# Patient Record
Sex: MALE | Race: WHITE | Employment: FULL TIME | URBAN - NONMETROPOLITAN AREA
[De-identification: names, ages, dates, MRNs, and addresses within clinical notes are randomized per-mention and may not be internally consistent; named-entity substitution may affect disease eponyms.]

---

## 2020-08-02 ENCOUNTER — HOSPITAL ENCOUNTER (INPATIENT)
Age: 30
LOS: 2 days | Discharge: HOME | DRG: 885 | End: 2020-08-04
Attending: PSYCHIATRY & NEUROLOGY | Admitting: PSYCHIATRY & NEUROLOGY

## 2020-08-02 PROBLEM — F29 PSYCHOSIS (HCC): Status: ACTIVE | Noted: 2020-08-02

## 2020-08-02 LAB
ACETAMINOPHEN LEVEL: < 5 UG/ML (ref 0–20)
ALBUMIN SERPL-MCNC: 4.7 G/DL (ref 3.5–5.1)
ALP BLD-CCNC: 89 U/L (ref 38–126)
ALT SERPL-CCNC: 23 U/L (ref 11–66)
AMPHETAMINE+METHAMPHETAMINE URINE SCREEN: NEGATIVE
ANION GAP SERPL CALCULATED.3IONS-SCNC: 14 MEQ/L (ref 8–16)
AST SERPL-CCNC: 26 U/L (ref 5–40)
BARBITURATE QUANTITATIVE URINE: NEGATIVE
BASOPHILS # BLD: 0.9 %
BASOPHILS ABSOLUTE: 0.1 THOU/MM3 (ref 0–0.1)
BENZODIAZEPINE QUANTITATIVE URINE: NEGATIVE
BILIRUB SERPL-MCNC: 0.7 MG/DL (ref 0.3–1.2)
BILIRUBIN DIRECT: < 0.2 MG/DL (ref 0–0.3)
BILIRUBIN URINE: NEGATIVE
BLOOD, URINE: NEGATIVE
BUN BLDV-MCNC: 13 MG/DL (ref 7–22)
CALCIUM SERPL-MCNC: 9.4 MG/DL (ref 8.5–10.5)
CANNABINOID QUANTITATIVE URINE: NEGATIVE
CHARACTER, URINE: CLEAR
CHLORIDE BLD-SCNC: 99 MEQ/L (ref 98–111)
CO2: 25 MEQ/L (ref 23–33)
COCAINE METABOLITE QUANTITATIVE URINE: NEGATIVE
COLOR: YELLOW
CREAT SERPL-MCNC: 0.8 MG/DL (ref 0.4–1.2)
EOSINOPHIL # BLD: 3.2 %
EOSINOPHILS ABSOLUTE: 0.3 THOU/MM3 (ref 0–0.4)
ERYTHROCYTE [DISTWIDTH] IN BLOOD BY AUTOMATED COUNT: 12.9 % (ref 11.5–14.5)
ERYTHROCYTE [DISTWIDTH] IN BLOOD BY AUTOMATED COUNT: 41 FL (ref 35–45)
ETHYL ALCOHOL, SERUM: 0.05 %
GFR SERPL CREATININE-BSD FRML MDRD: > 90 ML/MIN/1.73M2
GLUCOSE BLD-MCNC: 97 MG/DL (ref 70–108)
GLUCOSE URINE: NEGATIVE MG/DL
HCT VFR BLD CALC: 45.2 % (ref 42–52)
HEMOGLOBIN: 15.9 GM/DL (ref 14–18)
IMMATURE GRANS (ABS): 0.04 THOU/MM3 (ref 0–0.07)
IMMATURE GRANULOCYTES: 0.4 %
KETONES, URINE: NEGATIVE
LEUKOCYTE ESTERASE, URINE: NEGATIVE
LYMPHOCYTES # BLD: 32.9 %
LYMPHOCYTES ABSOLUTE: 3.2 THOU/MM3 (ref 1–4.8)
MCH RBC QN AUTO: 30.9 PG (ref 26–33)
MCHC RBC AUTO-ENTMCNC: 35.2 GM/DL (ref 32.2–35.5)
MCV RBC AUTO: 87.9 FL (ref 80–94)
MONOCYTES # BLD: 5.4 %
MONOCYTES ABSOLUTE: 0.5 THOU/MM3 (ref 0.4–1.3)
NITRITE, URINE: NEGATIVE
NUCLEATED RED BLOOD CELLS: 0 /100 WBC
OPIATES, URINE: NEGATIVE
OSMOLALITY CALCULATION: 275.7 MOSMOL/KG (ref 275–300)
OXYCODONE: NEGATIVE
PH UA: 6.5 (ref 5–9)
PHENCYCLIDINE QUANTITATIVE URINE: NEGATIVE
PLATELET # BLD: 232 THOU/MM3 (ref 130–400)
PMV BLD AUTO: 8.7 FL (ref 9.4–12.4)
POTASSIUM SERPL-SCNC: 3.5 MEQ/L (ref 3.5–5.2)
PROTEIN UA: NEGATIVE
RBC # BLD: 5.14 MILL/MM3 (ref 4.7–6.1)
SALICYLATE, SERUM: < 0.3 MG/DL (ref 2–10)
SEG NEUTROPHILS: 57.2 %
SEGMENTED NEUTROPHILS ABSOLUTE COUNT: 5.6 THOU/MM3 (ref 1.8–7.7)
SODIUM BLD-SCNC: 138 MEQ/L (ref 135–145)
SPECIFIC GRAVITY, URINE: < 1.005 (ref 1–1.03)
TOTAL PROTEIN: 7.8 G/DL (ref 6.1–8)
TSH SERPL DL<=0.05 MIU/L-ACNC: 1.84 UIU/ML (ref 0.4–4.2)
UROBILINOGEN, URINE: 0.2 EU/DL (ref 0–1)
WBC # BLD: 9.8 THOU/MM3 (ref 4.8–10.8)

## 2020-08-02 PROCEDURE — 99223 1ST HOSP IP/OBS HIGH 75: CPT | Performed by: PSYCHIATRY & NEUROLOGY

## 2020-08-02 PROCEDURE — 6370000000 HC RX 637 (ALT 250 FOR IP): Performed by: PSYCHIATRY & NEUROLOGY

## 2020-08-02 PROCEDURE — 80053 COMPREHEN METABOLIC PANEL: CPT

## 2020-08-02 PROCEDURE — G0480 DRUG TEST DEF 1-7 CLASSES: HCPCS

## 2020-08-02 PROCEDURE — 82248 BILIRUBIN DIRECT: CPT

## 2020-08-02 PROCEDURE — APPSS60 APP SPLIT SHARED TIME 46-60 MINUTES: Performed by: NURSE PRACTITIONER

## 2020-08-02 PROCEDURE — 80307 DRUG TEST PRSMV CHEM ANLYZR: CPT

## 2020-08-02 PROCEDURE — 1240000000 HC EMOTIONAL WELLNESS R&B

## 2020-08-02 PROCEDURE — 99283 EMERGENCY DEPT VISIT LOW MDM: CPT

## 2020-08-02 PROCEDURE — 81003 URINALYSIS AUTO W/O SCOPE: CPT

## 2020-08-02 PROCEDURE — 84443 ASSAY THYROID STIM HORMONE: CPT

## 2020-08-02 PROCEDURE — 36415 COLL VENOUS BLD VENIPUNCTURE: CPT

## 2020-08-02 PROCEDURE — 6370000000 HC RX 637 (ALT 250 FOR IP): Performed by: NURSE PRACTITIONER

## 2020-08-02 PROCEDURE — 85025 COMPLETE CBC W/AUTO DIFF WBC: CPT

## 2020-08-02 RX ORDER — TRAZODONE HYDROCHLORIDE 50 MG/1
50 TABLET ORAL NIGHTLY PRN
Status: DISCONTINUED | OUTPATIENT
Start: 2020-08-02 | End: 2020-08-04 | Stop reason: HOSPADM

## 2020-08-02 RX ORDER — ACETAMINOPHEN 325 MG/1
650 TABLET ORAL EVERY 4 HOURS PRN
Status: DISCONTINUED | OUTPATIENT
Start: 2020-08-02 | End: 2020-08-04 | Stop reason: HOSPADM

## 2020-08-02 RX ORDER — RISPERIDONE 0.25 MG/1
0.5 TABLET, FILM COATED ORAL 2 TIMES DAILY
Status: DISCONTINUED | OUTPATIENT
Start: 2020-08-02 | End: 2020-08-04 | Stop reason: HOSPADM

## 2020-08-02 RX ORDER — MAGNESIUM HYDROXIDE/ALUMINUM HYDROXICE/SIMETHICONE 120; 1200; 1200 MG/30ML; MG/30ML; MG/30ML
30 SUSPENSION ORAL EVERY 6 HOURS PRN
Status: DISCONTINUED | OUTPATIENT
Start: 2020-08-02 | End: 2020-08-04 | Stop reason: HOSPADM

## 2020-08-02 RX ORDER — NICOTINE 21 MG/24HR
1 PATCH, TRANSDERMAL 24 HOURS TRANSDERMAL DAILY
Status: DISCONTINUED | OUTPATIENT
Start: 2020-08-02 | End: 2020-08-03

## 2020-08-02 RX ORDER — IBUPROFEN 200 MG
400 TABLET ORAL EVERY 6 HOURS PRN
Status: DISCONTINUED | OUTPATIENT
Start: 2020-08-02 | End: 2020-08-04 | Stop reason: HOSPADM

## 2020-08-02 RX ORDER — HYDROXYZINE HYDROCHLORIDE 25 MG/1
50 TABLET, FILM COATED ORAL 3 TIMES DAILY PRN
Status: DISCONTINUED | OUTPATIENT
Start: 2020-08-02 | End: 2020-08-04 | Stop reason: HOSPADM

## 2020-08-02 RX ADMIN — HYDROXYZINE HYDROCHLORIDE 50 MG: 25 TABLET ORAL at 05:56

## 2020-08-02 RX ADMIN — RISPERIDONE 0.5 MG: 0.25 TABLET ORAL at 08:41

## 2020-08-02 RX ADMIN — TRAZODONE HYDROCHLORIDE 50 MG: 50 TABLET ORAL at 20:44

## 2020-08-02 RX ADMIN — HYDROXYZINE HYDROCHLORIDE 50 MG: 25 TABLET ORAL at 20:44

## 2020-08-02 RX ADMIN — RISPERIDONE 0.5 MG: 0.25 TABLET ORAL at 20:41

## 2020-08-02 RX ADMIN — HYDROXYZINE HYDROCHLORIDE 50 MG: 25 TABLET ORAL at 14:43

## 2020-08-02 ASSESSMENT — SLEEP AND FATIGUE QUESTIONNAIRES
RESTFUL SLEEP: NO
DO YOU HAVE DIFFICULTY SLEEPING: YES
DIFFICULTY STAYING ASLEEP: YES
AVERAGE NUMBER OF SLEEP HOURS: 5
DO YOU USE A SLEEP AID: COMMENT
DIFFICULTY FALLING ASLEEP: YES
DO YOU USE A SLEEP AID: NO
DIFFICULTY STAYING ASLEEP: YES
AVERAGE NUMBER OF SLEEP HOURS: 5
DIFFICULTY FALLING ASLEEP: YES
AVERAGE NUMBER OF SLEEP HOURS: 4
DIFFICULTY FALLING ASLEEP: YES
DIFFICULTY ARISING: NO
SLEEP PATTERN: DIFFICULTY FALLING ASLEEP;DISTURBED/INTERRUPTED SLEEP;RESTLESSNESS;NIGHTMARES/TERRORS
DO YOU HAVE DIFFICULTY SLEEPING: YES
DIFFICULTY STAYING ASLEEP: YES
DO YOU HAVE DIFFICULTY SLEEPING: YES
RESTFUL SLEEP: NO
SLEEP PATTERN: DIFFICULTY FALLING ASLEEP;RESTLESSNESS;DISTURBED/INTERRUPTED SLEEP;NIGHTMARES/TERRORS
RESTFUL SLEEP: NO
SLEEP PATTERN: DIFFICULTY FALLING ASLEEP;DISTURBED/INTERRUPTED SLEEP
DO YOU USE A SLEEP AID: NO
DIFFICULTY ARISING: NO
DIFFICULTY ARISING: NO

## 2020-08-02 ASSESSMENT — PATIENT HEALTH QUESTIONNAIRE - PHQ9
SUM OF ALL RESPONSES TO PHQ QUESTIONS 1-9: 17
SUM OF ALL RESPONSES TO PHQ QUESTIONS 1-9: 17

## 2020-08-02 ASSESSMENT — ENCOUNTER SYMPTOMS
BACK PAIN: 0
COUGH: 0
RHINORRHEA: 0
CHEST TIGHTNESS: 0

## 2020-08-02 ASSESSMENT — PAIN SCALES - GENERAL
PAINLEVEL_OUTOF10: 0

## 2020-08-02 ASSESSMENT — LIFESTYLE VARIABLES: HISTORY_ALCOHOL_USE: YES

## 2020-08-02 NOTE — PLAN OF CARE
Problem: Altered Mood, Psychotic Behavior:  Goal: Able to demonstrate trust by eating, participating in treatment and following staff's direction  Description: Able to demonstrate trust by eating, participating in treatment and following staff's direction  Outcome: Ongoing  Note: Pt has slept most of the shift, has not attended group  Goal: Able to verbalize decrease in frequency and intensity of hallucinations  Description: Able to verbalize decrease in frequency and intensity of hallucinations  Outcome: Ongoing  Note: Pt denies hallucinations at time of assessment  Goal: Able to verbalize reality based thinking  Description: Able to verbalize reality based thinking  Outcome: Met This Shift  Note: Pt able to verbalize reality based thinking  Goal: Ability to interact with others will improve  Description: Ability to interact with others will improve  Outcome: Ongoing  Note: Pt has slept most of the shift, has not attended group, isolative     Problem: Substance Abuse:  Goal: Absence of drug withdrawal signs and symptoms  Description: Absence of drug withdrawal signs and symptoms  Outcome: Met This Shift  Note: Pt denies all symptoms of withdrawal     Problem: Anxiety:  Goal: Level of anxiety will decrease  Description: Level of anxiety will decrease  Outcome: Ongoing  Note: Patient reports some anxiety. Pt sleeping     Problem: Activity:  Goal: Sleeping patterns will improve  Description: Sleeping patterns will improve  Outcome: Not Met This Shift  Note: Patient slept 0 hours last night, reports they slept poorly  Encourage patient not to take naps during the day, relax several hours before bed and to take prescribed sleep meds as ordered. Patient  verbalized understanding.        Problem: Discharge Planning:  Goal: Discharged to appropriate level of care  Description: Discharged to appropriate level of care  Outcome: Not Met This Shift  Note: Discharge planners working with patient to achieve optimal discharge plan, specific to the needs of this patient. Problem: KNOWLEDGE DEFICIT,EDUCATION,DISCHARGE PLAN  Goal: Knowledge - personal safety  Outcome: Ongoing  Note: Maintained in safe and secure environment. Patient did not fill out safety plan this shift. Care plan reviewed with patient.   Patient does not verbalize understanding of the plan of care and does not contribute to goal setting

## 2020-08-02 NOTE — BH NOTE
INPATIENT RECREATIONAL THERAPY  ADULT BEHAVIORAL SERVICES  EVALUATION    REFERRING PHYSICIAN:  Dr. Chika Alas   DIAGNOSIS:   Unspecified Psychosis, Possible Bipolar   PRECAUTIONS:  Standard Precautions   HISTORY OF PRESENT ILLNESS/INJURY: Pt admitted to the unit on an [facility] after reporting that he is hearing voices outside his window. Pt documented as returning to [place], [place] from [place] believing that there is a sex trafficking ring there. Pt reports that he was afraid and went to the police for a witness protection program. Pt documented as saying he lived in a small room behind a kitchen in a restaurant. Pt denies self harm but reports he was buying medications off of the street to take for anxiety. Pt is documented that he is anxious he is going to get killed because he knows about the sex trafficking ring. Pt documented as reporting he is getting threatening phone calls since leaving the hotel in [place]. PMH:  Please see medical chart for prior medical history, allergies, and medication    HISTORY OF PSYCHIATRIC TREATMENT: Pt denies any previous inpatient psychiatric hospitalizations as well as past suicidal gestures. Pt reports he did see psychiatry in 2005. 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 Pt reports that he drinks alcohol daily (\"a couple white claws daily\"). Pt denies tobacco use. Pt UDS is negative. Pt documented buying Klonopin and Xanax on the street.      LEISURE INTERESTS:  TV/movies, listening to music, outdoor activities, phone, current events, activities with friends, work   ACTIVITY PREFERENCE: No preference   ACTIVITY TYPES:  Indoor, Outdoor, Active, Passive  COGNITION: A&Ox4    COPING: Poor   ATTENTION: Fair   RELAXATION: Poor   SELF-ESTEEM: Poor   MOTIVATION:  Fair SOCIAL SKILLS:  Fair   FRUSTRATION TOLERANCE:  No documented hx of violence   ATTENTION SEEKING: No attention seeking behaviors noted at this time   COOPERATION: Pt cooperative   AFFECT: Flat   APPEARANCE: Pt displays fair grooming and hygiene and is currently dressed in hospital scrub attire. HEARING:  No impairments noted at this time   VISION:  Corrective Lenses: Glasses   VERBAL COMMUNICATION:  No impairments noted at this time   WRITTEN COMMUNICATION:   No impairments noted at this time     COORDINATION:  No impairments noted at this time   MOBILITY:   Pt ambulates independently   GOALS: Pt to increase socialization and knowledge of coping skills through participation in group therapy sessions following verbal encouragement from staff.

## 2020-08-02 NOTE — ED PROVIDER NOTES
[address]  Pt Name: Heide Shone  MRN: [id number]  [place] 1990  Date of evaluation: 8/2/2020  Provider: ANDREW Hawkins CNP    CHIEF COMPLAINT       Chief Complaint   Patient presents with   [facility] Psychiatric Evaluation       Nurses Notes reviewed and I agree except as noted in the HPI. HISTORY OF PRESENT ILLNESS    Heide Shone is a 34 y.o. male whopresents to the emergency department via [address] [facility] for psych evaluation. Pt states he was in [place] when he thinks he found out about a \"child sex trafficking ring. \" He was not willing to disclose more information about this. He then started hearing voices that told him he was going to be killed if he said anything about it. Pt reports fearing for his life and went to the [address] police station, and they called EMS. He denies suicidal or homicidal ideation. He also denies hearing voices that others can't hear or seeing things others can't hear. No complaints at this time. HPI was provided by the patient. Triage notes and Nursing notes were reviewed by myself. Any discrepancies are addressed above. REVIEW OF SYSTEMS     Review of Systems   Constitutional: Negative for chills, fatigue and fever. HENT: Negative for congestion, ear discharge, ear pain, postnasal drip and rhinorrhea. Respiratory: Negative for cough and chest tightness. Genitourinary: Negative for difficulty urinating, dysuria, enuresis, flank pain and hematuria. Musculoskeletal: Negative for back pain and joint swelling. Neurological: Negative for dizziness, light-headedness, numbness and headaches. Psychiatric/Behavioral: Negative for agitation, behavioral problems and confusion. All pertinent systems were reviewed and are negative unless indicated in the HPI. PAST MEDICAL HISTORY    has no past medical history on file. SURGICAL HISTORY      has no past surgical history on file.     CURRENT MEDICATIONS       There are no preliminarily interpreted by the emergencyphysician unless otherwise stated below. No orders to display         LABS:   Labs Reviewed   CBC WITH AUTO DIFFERENTIAL - Abnormal; Notable for the following components:       Result Value    MPV 8.7 (*)     All other components within normal limits   SALICYLATE LEVEL - Abnormal; Notable for the following components:    Salicylate, Serum < 0.3 (*)     All other components within normal limits   ACETAMINOPHEN LEVEL   BASIC METABOLIC PANEL   ETHANOL   HEPATIC FUNCTION PANEL   TSH WITHOUT REFLEX   URINE DRUG SCREEN   URINE RT REFLEX TO CULTURE   ANION GAP   OSMOLALITY   GLOMERULAR FILTRATION RATE, ESTIMATED         EMERGENCYDEPARTMENT COURSE AND MEDICAL DECISION MAKING:   Vitals:    Vitals:    08/02/20 0210 08/02/20 0504 08/02/20 0752 08/02/20 2004   BP: (!) 144/89 (!) 137/90 (!) 146/65 (!) 107/49   Pulse: 84 83 88 52   Resp: 17 18 16 16   Temp: 97.6 °F (36.4 °C) 97.1 °F (36.2 °C) 96.9 °F (36.1 °C) 97.2 °F (36.2 °C)   TempSrc: Oral Tympanic Tympanic Oral   SpO2: 97% 99% 99% 100%   Weight:  180 lb (81.6 kg)     Height:  6' 2\" (1.88 m)           Pertinent Labs & Imaging studies reviewed. (See chart for details)           Controlled Substances Monitoring:     No flowsheet data found. The patient was seen and evaluated within the ED today for the evaluation of suicidal ideation. Physical exam revealed no significant abnormalities or concerns. I completed a medical evaluation of the patient and ordered appropriate labs which were unremarkable. [facility] and social work completed a full psychiatric evaluation of the patient and determined that he met inpatient criteria. I medically cleared the patient. [facility] and social work's noted should be consulted for the psychiatric evaluation and reason for admission to the inpatient psychiatric unit.      Strict return precautions and follow up instructions were discussed with the patient with which the patient agrees     Physical assessment findings, diagnostic testing(s) if applicable, and vital signs reviewed with patient/patient representative. Questions answered. Medications asdirected, including OTC medications for supportive care. Education provided on medications. Differential diagnosis(s) discussed with patient/patient representative. Home care/self care instructions reviewed withpatient/patient representative. Patient is to follow-up with family care provider in 2-3 days if no improvement. Patient is to go to the emergency department if symptoms worsen. Patient/patient representative isaware of care plan, questions answered, verbalizes understanding and is in agreement. ED Medications administered this visit:   Medications   acetaminophen (TYLENOL) tablet 650 mg (has no administration in time range)   ibuprofen (ADVIL;MOTRIN) tablet 400 mg (has no administration in time range)   hydrOXYzine (ATARAX) tablet 50 mg (50 mg Oral Given 8/2/20 2044)   traZODone (DESYREL) tablet 50 mg (50 mg Oral Given 8/2/20 2044)   magnesium hydroxide (MILK OF MAGNESIA) 400 MG/5ML suspension 30 mL (has no administration in time range)   aluminum & magnesium hydroxide-simethicone (MAALOX) 200-200-20 MG/5ML suspension 30 mL (has no administration in time range)   nicotine (NICODERM CQ) 14 MG/24HR 1 patch (1 patch Transdermal Not Given 8/2/20 0845)   risperiDONE (RISPERDAL) tablet 0.5 mg (0.5 mg Oral Given 8/2/20 2041)           I have given the patient strict written and verbal instructions about care at home,follow-up, and signs and symptoms of worsening of condition and they did verbalize understanding. Patient was seen independently by myself. The Patient's final impression and disposition and plan was determined by myself. CRITICAL CARE:   None    CONSULTS:  None    PROCEDURES:  None    FINAL IMPRESSION      1.  Delusional disorder [facility])          DISPOSITION/PLAN   DISPOSITION Admitted 08/02/2020 04:20:22 AM        PATIENT REFERRED TO:  No follow-up provider specified. DISCHARGE MEDICATIONS:  There are no discharge medications for this patient.       (Please note that portions of this note were completed with a voice recognition program.  Efforts were made to edit thedictations but occasionally words are mis-transcribed.)    ANDREW Ordaz - ANDREW Ospina CNP  08/03/20 [id number]

## 2020-08-02 NOTE — BH NOTE
Behavioral Health   Admission Note     Admission Type:   Admission Type: Involuntary    Reason for admission:  Reason for Admission: Hearing voices outside the window    PATIENT STRENGTHS:  Strengths: Communication, Positive Support, No significant Physical Illness    Patient Strengths and Limitations:  Limitations: Difficulty problem solving/relies on others to help solve problems    Addictive Behavior:   Addictive Behavior  Do you have a history of Chemical Use?: No  Do you have a history of Alcohol Use?: Yes  Do you have a history of Street Drug Abuse?: Yes  Histroy of Prescripton Drug Abuse?: No    Medical Problems:   History reviewed. No pertinent past medical history. Status EXAM:  Status and Exam  Normal: No  Facial Expression: Avoids Gaze  Affect: Constricted  Level of Consciousness: Alert  Mood:Normal: No  Mood: Anxious, Suspicious  Motor Activity:Normal: Yes  Interview Behavior: Evasive  Preception: [place] to Person, Theresa Sauger to Time, [place] to Place, [place] to Situation  Attention:Normal: No  Attention: Distractible  Thought Processes: Circumstantial  Thought Content:Normal: No  Thought Content: Paranoia, Delusions  Hallucinations: Auditory (Comment)  Delusions: Yes  Delusions: Other(See Comment)(paranoia)  Memory:Normal: Yes  Insight and Judgment: No  Insight and Judgment: Poor Judgment, Poor Insight  Present Suicidal Ideation: No  Present Homicidal Ideation: No    Pt admitted with followings belongings:  Dentures: None  Vision - Corrective Lenses: Glasses  Hearing Aid: None  Jewelry: None  Body Piercings Removed: N/A  Clothing: Shirt, Pants, Footwear, Undergarments (Comment)  Were All Patient Medications Collected?: Not Applicable  Other Valuables: Money (Comment), Wallet, Other (Comment), Cell phone, Keys($50, ID)     Admission order obtained Yes. Valuables placed in safe in security envelope, number:  [id number]. Patient oriented to surroundings and program expectations and copy of patient rights given. Received admission packet:  yes. Consents reviewed, signed yes. Patient verbalize understanding:  yes. Patient education on precautions: yes           Explained patients right to have family, representative or physician notified of their admission. Patient has Declined for physician to be notified. Patient has Declined for family/representative to be notified. Provided pt with [employer] handout entitled \"Quitting Smoking. \"  Reviewed handout with pt addressing dangers of smoking, developing coping skills, and providing basic information about quitting. Pt response to counseling:  States not a smoker. Admitted from ED. [facility]. States that he lived in [place] from March to June of this year in the back room of a hotel. States that he believes that children were being sex trafficked there because he heard children's voices. States that he fled to [place] to get away from that . Reported in ED that he has had threatening phone calls and that he has heard voices outside of his window at home telling him that he was going to get killed. Also stated in ED that he believes that his phone is bugged. Upon arrival to the unit, patient states that he went to the police for protection and that they told him he was crazy, that didn't happen, and brought him here. States that he occasionally puffs marijuana when they pass it around at work. States that he does not smoke cigarettes,he does not like them. Reports that he drinks alcohol daily. States that he drinks 11-12 beers per day and that he has done so since he was about 13years old. Denies suicidal thoughts or plans. Denies homicidal thoughts or plans. Denies hallucinations. Oriented to room and unit. Consents signed. [address] admission status.            Jaelyn Newton, RADHA

## 2020-08-02 NOTE — PROGRESS NOTES
Provisional Diagnosis:   psychosis     Risk, Psychosocial and Contextual Factors:  Hallucinations, feels unsafe/in danger    Current MH Treatment: denies      Present Suicidal Behavior:      Verbal: denies         Attempt: denies    Access to Weapons:  denies    Current Suicide Risk: Low, Moderate or High:    low    Past Suicidal Behavior:       Verbal: denies    Attempt: denies    Self-Injurious/Self-Mutilation: denies    Traumatic Event Within Past 2 Weeks:   Pt 'fled' from [place] to [place] early June d/t feeling unsafe. States he witnessed a \"child sex trafficking ring\" in a hotel he was staying at. Pt states he has been receiving threatening phone calls and hearing voices outside his window that were threatening him. Current Abuse: denies    Legal: denies    Violence: denies    Protective Factors:  positive support, stable housing,     Housing:    Lives alone, [place]    CPAP/Oxygen/Ambulation Difficulties: none     Basic Vital Signs Normal?: Check with Patients Nurse prior to [address]?: Check with Patients Nurse prior to Calling Psychiatry    Clinical Summary:      Patient is a 34year old male who presents to the ED voluntarily via EMS, pt later [facility] by medical provider. Pt reports tonight, he heard voices outside his window telling him, \"you should not have said anything, now you're going to die. \" Pt reports in March he moved to [place] with a friend. There he lived in a [place] of a kitchen\" in a hotel. He is reporting that he believes there was a \"child sex trafficking ring. \" He states that he heard little kid voices and saw 'grotesque' things. He states that while he was there he did not tell anyone about what was going on. Then in June he 'fled' back to [place] because he felt unsafe. Since he has been in [place] he has started to 'put the pieces together' as to what was happening.  He stated people in [place] were asking him when the last time he was part of a 'pizza party.' Since in [place] he has been receiving threatening phone calls and hearing voices. He expressed that he believes his phone is bugged because 'they found where I lived tonight. And they are going to kill me.' He went to the police station in [facility] to tell them what was happening and asking for witness protection because he is in danger. Clinician discussed with pt the added social media coverage on child sex trafficking. Pt states, \"yea I see it all the time and I've read up on [employer] and [employer]. I know that's what happened. \"     Suicidal and Homicidal thoughts and/or plans denied. Reports occasional alcohol use, smoked marijuana last week. Pt is alert/oriented x4. Pt is cooperative. Pt avoids eye contact. He is fixated on discussing the need for clinician to \"investigate his case, rather than prove his is not crazy. \" Pt shows signs of paranoia and obsession within his story. Level of Care Disposition:      248: Consulted with medical provider. Patient is medically cleared. 319: attempted consult with Dr. Noa Tucker. No answer, left a voicemail. 349: Consulted with Dr Noa Tucker. Pt to be admitted to .   432: Gave report to [facility]. Bed assignment.  67B

## 2020-08-02 NOTE — H&P
disorganization noted  Suicidal Ideation: Denies suicidal ideations  Homicidal ideation: Denies homicidal ideations  Delusions: Subtle paranoia  Perceptual Disturbance: Denies AH/VH  Cognition: Oriented to person, place, time and situation   Concentration fair   Memory intact   Insight: Poor  Judgment: Poor    Vitals:    08/02/20 0752   BP: (!) 146/65   Pulse: 88   Resp: 16   Temp: 96.9 °F (36.1 °C)   SpO2: 99%     Review of Systems   Constitutional: Negative for chills and weight loss. HENT: Negative for ear pain and nosebleeds. Eyes: Negative for blurred vision and photophobia. Respiratory: Negative for cough, shortness of breath and wheezing. Cardiovascular: Negative for chest pain and palpitations. Gastrointestinal: Negative for abdominal pain, diarrhea and vomiting. Genitourinary: Negative for dysuria and urgency. Musculoskeletal: Negative for falls and joint pain. Skin: Negative for itching and rash. Neurological: Negative for tremors, seizures and weakness. Endo/Heme/Allergies: Does not bruise/bleed easily. Physical Exam:      Constitutional:  Appears well-developed and well-nourished, no acute distress  HENT:   Head: Normocephalic and atraumatic. Eyes: Conjunctivae are normal. Right eye exhibits no discharge. Left eye exhibits no discharge. No scleral icterus. Neck: Normal range of motion. Neck supple. Pulmonary/Chest:  No respiratory distress or accessory muscle use, no wheezing. Abdominal: Soft. Exhibits no distension. Musculoskeletal: Normal range of motion. Exhibits no edema. Neurological: cranial nerves II-XII grossly in tact, normal gait and station  Skin: Skin is warm and dry. Patient is not diaphoretic. No erythema. Impression:  Unspecified Psychosis  Possible Bipolar D/O  SREE    Plan:  Admit to 4 psychiatric unit for symptom stabilization and medication management. Introduce to unit milieu, groups and individual therapies.   Start Risperdal 0.5mg po [facility]        Behavioral Services  Medicare Certification     Admission Day 1  I certify that this patient's inpatient psychiatric hospital admission is medically necessary for:    (1) treatment which could reasonably be expected to improve this patient's condition, or    (2) diagnostic study or its equivalent. Physicians Signature: Electronically signed by Brice Vance CNP [id number]                                   Psychiatry Attending Attestation     I assessed this patient and reviewed the case and plan of care with Brice Vance CNP. I have reviewed the above documentation and I agree with the findings and treatment plan with the following updates. Patient was evaluated by Georgeanne Sandifer on the unit and I evaluated patient as tele visit. Patient is a 66-year-old single  male with no significant psychiatric history admitted on an [facility] for worsening auditory hallucinations and paranoia. Per report patient believed that his phones were tapped and people went out there trying to hurt him. Also reported hearing voices saying \"you should not have said anything\". Reports this incident began in [place] where he was working as a  and mentions noticing a child trafficking ring. Patient reports that he is convinced that this people are out there trying to hurt him as he sent a text to them about the details. Reports poor energy and concentration. Reports dealing with high anxiety, worrying excessively about the people were out to get him from [place]. It is unclear at this point if this is paranoid or real.  However patient did approach [place] police to give him protection. Reports that his anxiety is much lower here on the unit and reports feeling safe here on the unit. Reports some racing thoughts at night that are keeping him up.   Agree with rest of the assessment and plan as above   Oziel Saul is a 34 y.o. male being evaluated by a Virtual Visit (video visit) encounter to address concerns as mentioned above. A caregiver was present in the room along with the patient. Pursuant to the emergency declaration under the [facility] [address] authority and the [employer] Act, this Virtual Visit was conducted with patient's (and/or legal guardian's) consent, to reduce the patient's risk of exposure to COVID-19 and provide necessary medical care. Services were provided through a video synchronous discussion virtually to substitute for in-person visit by provider. Patient is present at [address] [place] [address] on unit [address] and I am physically present at my home in [facility]     --Aminah Douglas MD on 8/2/2020 at 10:05 AM    An electronic signature was used to authenticate this note. **This report has been created using voice recognition software. It may contain minor errors which are inherent in voice recognition technology. **

## 2020-08-02 NOTE — BH NOTE
Pt declined to attend [place] and Goal group therapy session. Pt declined to attend [facility] [facility] group therapy session. Pt was provided maximum verbal encouragement to attend group therapy session, but pt remained resting in bed. No daily goal was obtained.

## 2020-08-02 NOTE — GROUP NOTE
Patient was encouraged to come to group. Personally invited by clinician Georgi Ruiz. Patient did not want to attend group today.

## 2020-08-03 PROCEDURE — 99232 SBSQ HOSP IP/OBS MODERATE 35: CPT | Performed by: PSYCHIATRY & NEUROLOGY

## 2020-08-03 PROCEDURE — 6370000000 HC RX 637 (ALT 250 FOR IP): Performed by: NURSE PRACTITIONER

## 2020-08-03 PROCEDURE — 6370000000 HC RX 637 (ALT 250 FOR IP): Performed by: PSYCHIATRY & NEUROLOGY

## 2020-08-03 PROCEDURE — 1240000000 HC EMOTIONAL WELLNESS R&B

## 2020-08-03 PROCEDURE — APPSS30 APP SPLIT SHARED TIME 16-30 MINUTES: Performed by: PHYSICIAN ASSISTANT

## 2020-08-03 PROCEDURE — 90833 PSYTX W PT W E/M 30 MIN: CPT | Performed by: PSYCHIATRY & NEUROLOGY

## 2020-08-03 RX ADMIN — RISPERIDONE 0.5 MG: 0.25 TABLET ORAL at 10:07

## 2020-08-03 RX ADMIN — HYDROXYZINE HYDROCHLORIDE 50 MG: 25 TABLET ORAL at 13:37

## 2020-08-03 RX ADMIN — HYDROXYZINE HYDROCHLORIDE 50 MG: 25 TABLET ORAL at 20:09

## 2020-08-03 RX ADMIN — RISPERIDONE 0.5 MG: 0.25 TABLET ORAL at 20:08

## 2020-08-03 RX ADMIN — TRAZODONE HYDROCHLORIDE 50 MG: 50 TABLET ORAL at 20:09

## 2020-08-03 ASSESSMENT — PAIN SCALES - GENERAL
PAINLEVEL_OUTOF10: 0
PAINLEVEL_OUTOF10: 0

## 2020-08-03 ASSESSMENT — PAIN - FUNCTIONAL ASSESSMENT: PAIN_FUNCTIONAL_ASSESSMENT: ACTIVITIES ARE NOT PREVENTED

## 2020-08-03 NOTE — PROGRESS NOTES
Mental Status Exam:   Level of consciousness:  within normal limits  Appearance:  well-appearing, hospital attire and in chair  Behavior/Motor:  no abnormalities noted  Attitude toward examiner:  cooperative, attentive and good eye contact  Speech:  spontaneous, normal rate and normal volume  Mood:  \"Good\"  Affect:  mood congruent  Thought processes:  linear, goal directed and coherent  Thought content:  Denies homicidal ideation  Suicidal Ideation:  denies suicidal ideation  Delusions:  no evidence of delusions  Perceptual Disturbance:  denies any perceptual disturbance  Cognition: Patient is oriented to person, place time and situation  Concentration: clinically adequate  Memory: intact  Insight & Judgement: fair       ASSESSMENT     Unspecified psychosis not due to a substance or known physiological condition ([facility] [address]   Possible Bipolar D/O  SREE    PLAN    Patient's symptoms show improvement today  Continue current medications as prescribed  Attempt to develop insight, psycho-education and supportive therapy conducted. Probable discharge: 1-2 days  Follow-up: DEREK      Norma Lewis is a 34 y.o. male being evaluated by a Virtual Visit (video visit) encounter to address concerns as mentioned above. A caregiver was present in the room along with the patient. Pursuant to the emergency declaration under the [facility], [address] [place] [address] authority and the [employer] and [employer] Act, this Virtual Visit was conducted with patient's (and/or legal guardian's) consent, to reduce the patient's risk of exposure to COVID-19 and provide necessary medical care. Services were provided through a video synchronous discussion virtually to substitute for in-person visit by provider.    Patient is present at [address] [place] [address] on unit 4E and I am physically present at my home in [address] on 8/3/2020 at 1:11 PM     An electronic signature was used to authenticate this note. Psychiatry Attending Attestation     I assessed this patient and reviewed the case and plan of care with Josue Beal PA-C. I have reviewed the above documentation and I agree with the findings and treatment plan with the following updates. Patient reports that he is doing better than before. Denies any suicidal or homicidal ideation plan or intent today. No longer paranoid. Denies any auditory hallucinations. Notes that Risperdal helped significantly clear his thoughts. Denies any side effect from the medication. He is hopeful about his recovery. Oriented to time place person and situation. Will discharge soon if he continues to improve. Case discussed with staff and treatment team this morning. More than 16 mins of the session was spent doing Supportive psychotherapy. Session lasted for over 30 mins. Aron Moreno is a 34 y.o. male being evaluated by a Virtual Visit (video visit) encounter to address concerns as mentioned above. A caregiver was present in the room along with the patient. Pursuant to the emergency declaration under the [facility], [address] [place] [address] authority and the [employer] and [employer] Act, this Virtual Visit was conducted with patient's (and/or legal guardian's) consent, to reduce the patient's risk of exposure to COVID-19 and provide necessary medical care. Services were provided through a video synchronous discussion virtually to substitute for in-person visit by provider. Patient is present at [address] [place] [address] on unit 4E and I am physically present at my home in [facility]     --Alice Yo MD on 8/3/2020 at 1:38 PM    An electronic signature was used to authenticate this note. **This report has been created using voice recognition software.  It may contain minor errors which are inherent in voice recognition technology. **

## 2020-08-03 NOTE — PLAN OF CARE
Problem: Role Relationship:  Goal: Ability to demonstrate positive changes in social behaviors and relationships will improve  Description: Ability to demonstrate positive changes in social behaviors and relationships will improve  Outcome: Met This Shift  Note: Pt attended 2/2 groups that have been offered in the unit. Pt has been out on the unit throughout the day and has been interacting with peers. Pt will be encouraged to continue group attendance and socialization.   Pt met socialization goal.

## 2020-08-03 NOTE — PLAN OF CARE
Problem: Altered Mood, Psychotic Behavior:  Goal: Able to demonstrate trust by eating, participating in treatment and following staff's direction  Description: Able to demonstrate trust by eating, participating in treatment and following staff's direction  8/3/2020 0058 by Sidney Kumar RN  Outcome: Ongoing  Note: Pt is eating, follows direction, taking meds as prescribed, did not attend group this evening  8/2/2020 1406 by Warren Burden RN  Outcome: Ongoing  Note: Pt has slept most of the shift, has not attended group  Goal: Ability to interact with others will improve  Description: Ability to interact with others will improve  8/3/2020 0058 by Sidney Kumar RN  Outcome: Ongoing  Note: Pt was talkative with nurse, pt was out for snack only this evening, no peer interaction noted  8/2/2020 1406 by Warren Burden RN  Outcome: Ongoing  Note: Pt has slept most of the shift, has not attended group, isolative     Problem: Anxiety:  Goal: Level of anxiety will decrease  Description: Level of anxiety will decrease  8/3/2020 0058 by Sidney Kumar RN  Outcome: Ongoing  Note: Pt had atarax for c/o anxiety  8/2/2020 1406 by Warren Burden RN  Outcome: Ongoing  Note: Patient reports some anxiety. Pt sleeping     Problem: Activity:  Goal: Sleeping patterns will improve  Description: Sleeping patterns will improve  8/3/2020 0058 by Sidney Kumar RN  Outcome: Ongoing  Note: Pt had trazodone for sleep  8/2/2020 1406 by Warren Burden RN  Outcome: Not Met This Shift  Note: Patient slept 0 hours last night, reports they slept poorly  Encourage patient not to take naps during the day, relax several hours before bed and to take prescribed sleep meds as ordered. Patient  verbalized understanding.        Problem: Discharge Planning:  Goal: Discharged to appropriate level of care  Description: Discharged to appropriate level of care  8/3/2020 0058 by Sidney Kumar RN  Outcome: Ongoing  Note: Pt plans to return home, lives alone  8/2/2020 1406 by Kendra Solorio RN  Outcome: Not Met This Shift  Note: Discharge planners working with patient to achieve optimal discharge plan, specific to the needs of this patient. Problem: KNOWLEDGE DEFICIT,EDUCATION,DISCHARGE PLAN  Goal: Knowledge - personal safety  8/3/2020 0058 by Eddie Lombardi RN  Outcome: Ongoing  Note: Pt did not work on safety plan this evening  8/2/2020 1406 by Kendra Solorio RN  Outcome: Ongoing  Note: Maintained in safe and secure environment. Patient did not fill out safety plan this shift. Problem: Altered Mood, Psychotic Behavior:  Goal: Able to verbalize decrease in frequency and intensity of hallucinations  Description: Able to verbalize decrease in frequency and intensity of hallucinations  8/3/2020 0058 by Eddie Lombardi RN  Outcome: Completed  Note: Pt denies hallucinations  8/2/2020 1406 by Kendra Solorio RN  Outcome: Ongoing  Note: Pt denies hallucinations at time of assessment  Goal: Able to verbalize reality based thinking  Description: Able to verbalize reality based thinking  8/3/2020 0058 by Eddie Lombardi RN  Outcome: Completed  Note: No delusions voiced, denies paranoia  8/2/2020 1406 by Kendra Solorio RN  Outcome: Met This Shift  Note: Pt able to verbalize reality based thinking     Problem: Substance Abuse:  Goal: Absence of drug withdrawal signs and symptoms  Description: Absence of drug withdrawal signs and symptoms  8/3/2020 0058 by Eddie Lombardi RN  Outcome: Completed  Note: Pt denies having any withdrawal symptoms  8/2/2020 1406 by Kendra Solorio RN  Outcome: Met This Shift  Note: Pt denies all symptoms of withdrawal   Care plan reviewed with patient.   Patient does verbalize understanding of the plan of care and does contribute to goal setting

## 2020-08-03 NOTE — GROUP NOTE
Group Therapy Note    Date: 8/3/2020    Group Start Time: 1115  Group End Time: 1200  Group Topic: Recreational    [facility] 4E    BREN Shirley    Group Therapy Note    Attendees: 9         Patient's Goal:  Pt will improve socialization and knowledge of coping skills through participation of recreation therapy music group. Notes:  Pt was cooperative and pleasant. Pt displayed active participation throughout the session and interacted with peers when appropriate. Pt identified a song to listen to and discussed each song after it was played. Status After Intervention:  Improved    Participation Level:  Active Listener and Interactive    Participation Quality: Appropriate, Attentive, Sharing and Supportive      Speech:  normal      Thought Process/Content: Logical      Affective Functioning: Congruent      Mood: euthymic      Level of consciousness:  Alert, Oriented x4 and Attentive      Response to Learning: Able to verbalize current knowledge/experience, Able to verbalize/acknowledge new learning, Able to retain information, Capable of insight, Able to change behavior and Progressing to goal      Endings: None Reported    Modes of Intervention: Education, Support, Socialization, Exploration, Clarifying, Activity and Media      Discipline Responsible: Psychoeducational Specialist      Signature:  BREN Isaac

## 2020-08-03 NOTE — PLAN OF CARE
LPN  Outcome: Ongoing  Note: Patient able to demonstrate positive in changes in social behaviors and relationships will improve   8/3/2020 1533 by Tung Rojas  Outcome: Met This Shift  Note: Pt attended 2/2 groups that have been offered in the unit. Pt has been out on the unit throughout the day and has been interacting with peers. Pt will be encouraged to continue group attendance and socialization. Pt met socialization goal.     Problem: Role Relationship:  Goal: Ability to demonstrate positive changes in social behaviors and relationships will improve  Description: Ability to demonstrate positive changes in social behaviors and relationships will improve  8/3/2020 1926 by Kane Espinosa LPN  Outcome: Ongoing  Note: Patient able to demonstrate positive in changes in social behaviors and relationships will improve   8/3/2020 1533 by Tung Rojas  Outcome: Met This Shift  Note: Pt attended 2/2 groups that have been offered in the unit. Pt has been out on the unit throughout the day and has been interacting with peers. Pt will be encouraged to continue group attendance and socialization. Pt met socialization goal.     Problem: Role Relationship:  Goal: Ability to demonstrate positive changes in social behaviors and relationships will improve  Description: Ability to demonstrate positive changes in social behaviors and relationships will improve  8/3/2020 1926 by Kane Espinosa LPN  Outcome: Ongoing  Note: Patient able to demonstrate positive in changes in social behaviors and relationships will improve   8/3/2020 1533 by Tung Rojas  Outcome: Met This Shift  Note: Pt attended 2/2 groups that have been offered in the unit. Pt has been out on the unit throughout the day and has been interacting with peers. Pt will be encouraged to continue group attendance and socialization.   Pt met socialization goal.     Problem: KNOWLEDGE DEFICIT,EDUCATION,DISCHARGE PLAN  Goal: Knowledge - personal safety  Outcome: Ongoing  Note: Patient did not complete personal safety plan this shift     Problem: KNOWLEDGE DEFICIT,EDUCATION,DISCHARGE PLAN  Goal: Knowledge - personal safety  Outcome: Ongoing  Note: Patient did not complete personal safety plan this shift     Problem: KNOWLEDGE DEFICIT,EDUCATION,DISCHARGE PLAN  Goal: Knowledge - personal safety  Outcome: Ongoing  Note: Patient did not complete personal safety plan this shift   Care plan reviewed with patient and . Patient and  verbalize understanding of the plan of care and contribute to goal setting.

## 2020-08-04 VITALS
HEART RATE: 70 BPM | BODY MASS INDEX: 23.1 KG/M2 | HEIGHT: 74 IN | WEIGHT: 180 LBS | DIASTOLIC BLOOD PRESSURE: 70 MMHG | RESPIRATION RATE: 18 BRPM | TEMPERATURE: 97.6 F | OXYGEN SATURATION: 98 % | SYSTOLIC BLOOD PRESSURE: 108 MMHG

## 2020-08-04 PROCEDURE — 99239 HOSP IP/OBS DSCHRG MGMT >30: CPT | Performed by: PSYCHIATRY & NEUROLOGY

## 2020-08-04 PROCEDURE — 6370000000 HC RX 637 (ALT 250 FOR IP): Performed by: NURSE PRACTITIONER

## 2020-08-04 PROCEDURE — 5130000000 HC BRIDGE APPOINTMENT

## 2020-08-04 RX ORDER — TRAZODONE HYDROCHLORIDE 50 MG/1
50 TABLET ORAL NIGHTLY PRN
Qty: 30 TABLET | Refills: 0 | Status: SHIPPED | OUTPATIENT
Start: 2020-08-04

## 2020-08-04 RX ORDER — RISPERIDONE 0.5 MG/1
0.5 TABLET, FILM COATED ORAL 2 TIMES DAILY
Qty: 60 TABLET | Refills: 0 | Status: SHIPPED | OUTPATIENT
Start: 2020-08-04

## 2020-08-04 RX ADMIN — RISPERIDONE 0.5 MG: 0.25 TABLET ORAL at 08:10

## 2020-08-04 ASSESSMENT — PAIN SCALES - GENERAL: PAINLEVEL_OUTOF10: 0

## 2020-08-04 NOTE — DISCHARGE SUMMARY
Provider Discharge Summary     Patient ID:  Nuria Mitchell  [id number]  28 y.o.  1990    Admit date: 8/2/2020    Discharge date and time: 8/4/2020  7:29 PM     Admitting Physician: Fan Mcintyre MD     Discharge Physician: Fan Mcintyre MD    Admission Diagnoses: Psychosis, unspecified psychosis type [facility]) [F29]    Discharge Diagnoses:      Unspecified psychosis not due to a substance or known physiological condition [facility])     Patient Active Problem List   Diagnosis Code    Unspecified psychosis not due to a substance or [facility]        Admission Condition: poor    Discharged Condition: stable    Indication for Admission: threat to self    History of Present Illnes (present tense wording is of findings from admission exam and are not necessarily indicative of current findings):   Patient is a 80-year-old single  male with no significant psychiatric history admitted on an [facility] for worsening auditory hallucinations and paranoia. Per report patient believed that his phones were tapped and people went out there trying to hurt him. Also reported hearing voices saying \"you should not have said anything\". Reports this incident began in [place] where he was working as a  and mentions noticing a child trafficking ring. Patient reports that he is convinced that this people are out there trying to hurt him as he sent a text to them about the details. Reports poor energy and concentration. Reports dealing with high anxiety, worrying excessively about the people were out to get him from [place]. It is unclear at this point if this is paranoid or real.  However patient did approach [place] police to give him protection. Reports that his anxiety is much lower here on the unit and reports feeling safe here on the unit. Reports some racing thoughts at night that are keeping him up.     Hospital Course:   Upon admission, Nuria Mitchell was provided a safe secure environment, introduced to unit milieu. Patient participated in groups and individual therapies. Meds were adjusted as noted below. After few days of hospital care, patient began to feel improvement. Depression lifted, thoughts to harm self ceased. Sleep improved, appetite was good. On morning rounds 8/4/2020, Elissa Rosales  endorses feeling ready for discharge. Patient denies suicidal or homicidal ideations, denies hallucinations or delusions. Denies SE's from meds. It was decided that maximum benefit from hospital care had been achieved and patient can be discharged. Consults:   none    Significant Diagnostic Studies: Routine labs and diagnostics    Treatments: Psychotropic medications, therapy with group, milieu, and 1:1 with nurses, social workers, PA-C/CNP, and Attending physician. Discharge Medications:  Discharge Medication List as of 8/4/2020  8:56 AM      START taking these medications    Details   traZODone (DESYREL) 50 MG tablet Take 1 tablet by mouth nightly as needed for Sleep or Depression, Disp-30 tablet,R-0Normal      risperiDONE (RISPERDAL) 0.5 MG tablet Take 1 tablet by mouth 2 times daily, Disp-60 tablet,R-0Normal          2  Discharge Exam:  Level of consciousness:  Within normal limits  Appearance: Street clothes, seated, with good grooming  Behavior/Motor: No abnormalities noted  Attitude toward examiner:  Cooperative, attentive, good eye contact  Speech:  spontaneous, normal rate, normal volume and well articulated  Mood:  euthymic  Affect:  Full range  Thought processes:  linear, goal directed and coherent  Thought content:  denies homicidal ideation  Suicidal Ideation:  denies suicidal ideation  Delusions:  no evidence of delusions  Perceptual Disturbance:  denies any perceptual disturbance  Cognition:  Intact  Memory: age appropriate  Insight & Judgement: fair  Medication side effects: denies     Disposition: home    Patient Instructions:    Activity: activity as

## 2020-08-04 NOTE — PLAN OF CARE
Problem: Nutrition  Goal: Optimal nutrition therapy  8/4/2020 0045 by Maverick Faulkner RN  Outcome: Met This Shift  Note: Pt reports his appetite is good, ate a snack this evening  8/3/2020 1926 by Saray Mcdaniel LPN  Outcome: Ongoing  Note: Patient did not achieve optimal therapy      Problem: Altered Mood, Psychotic Behavior:  Goal: Ability to interact with others will improve  Description: Ability to interact with others will improve  8/4/2020 0045 by Maverick Faulkner RN  Outcome: Ongoing  Note: Pt is interactive with staff, has some peer interaction  8/3/2020 1926 by Saray Mcdaniel LPN  Outcome: Ongoing  Note: Pat able to interact with others ill improve      Problem: Anxiety:  Goal: Level of anxiety will decrease  Description: Level of anxiety will decrease  8/4/2020 0045 by Maverick Faulkner RN  Outcome: Ongoing  Note: Pt had atarax for little anxiety at bedtime  8/3/2020 1926 by Saray Mcdaniel LPN  Outcome: Ongoing  Note: Patient level of anxiety did decrease with medication      Problem: Discharge Planning:  Goal: Discharged to appropriate level of care  Description: Discharged to appropriate level of care  8/4/2020 0045 by Maverick Faulkner RN  Outcome: Ongoing  Note: Pt plans to be discharged tomorrow, will return home,lives alone  8/3/2020 1926 by Saray Mcdaniel LPN  Outcome: Ongoing  Note: Discharge planner working with patient to achieve optimal discharge plan, specific to the needs of this patient.        Problem: KNOWLEDGE DEFICIT,EDUCATION,DISCHARGE PLAN  Goal: Knowledge - personal safety  8/4/2020 0045 by Maverick Faulkner RN  Outcome: Ongoing  Note: Pt did not work on safety plan  8/3/2020 1926 by Saray Mcdaniel LPN  Outcome: Ongoing  Note: Patient did not complete personal safety plan this shift     Problem: Role Relationship:  Goal: Ability to demonstrate positive changes in social behaviors and relationships will improve  Description: Ability to demonstrate positive changes in social behaviors and relationships will improve  8/4/2020 0045 by Molina Tucker RN  Outcome: Ongoing  8/3/2020 1926 by Linda Miller LPN  Outcome: Ongoing  Note: Patient able to demonstrate positive in changes in social behaviors and relationships will improve   8/3/2020 1533 by Rani Ghosh  Outcome: Met This Shift  Note: Pt attended 2/2 groups that have been offered in the unit. Pt has been out on the unit throughout the day and has been interacting with peers. Pt will be encouraged to continue group attendance and socialization. Pt met socialization goal.     Problem: Altered Mood, Psychotic Behavior:  Goal: Able to demonstrate trust by eating, participating in treatment and following staff's direction  Description: Able to demonstrate trust by eating, participating in treatment and following staff's direction  8/4/2020 0045 by Molina Tucker RN  Outcome: Completed  Note: Pt is eating, participating in his tx, follows direction  8/3/2020 1926 by Linda Miller LPN  Outcome: Ongoing  Note: Pt able to demonstrate trust by eating participating in treatment and following staff's direction      Problem: Activity:  Goal: Sleeping patterns will improve  Description: Sleeping patterns will improve  8/4/2020 0045 by Molina Tucker RN  Outcome: Completed  Note: Pt reports sleeping good with trazodone  8/3/2020 1926 by Linda Miller LPN  Outcome: Ongoing  Note: Patient sleeping pattern will improve    Care plan reviewed with patient.   Patient does verbalize understanding of the plan of care and does contribute to goal setting

## 2020-08-04 NOTE — BH NOTE
Group Therapy Note    Date: 8/4/2020  Start Time: 2000  End Time:  2020    Type of Group: Wrap-Up and relaxation    Patient's Goal:  Take a shower    Notes:  met    Status After Intervention:  Unchanged    Participation Level:  Active Listener    Participation Quality: Attentive      Speech:  normal      Thought Process/Content: Logical      Affective Functioning: Congruent      Mood: anxious      Level of consciousness:  Alert      Response to Learning: Able to verbalize current knowledge/experience      Endings: None Reported    Modes of Intervention: Socialization      Discipline Responsible: Registered Nurse      Signature:  Heather Chowdhury RN

## 2020-08-04 NOTE — PROGRESS NOTES
Behavioral Health   Discharge Note    Pt discharged with followings belongings:   Dentures: None  Vision - Corrective Lenses: Glasses  Hearing Aid: None  Jewelry: None  Body Piercings Removed: N/A  Clothing: Shirt, Pants, Footwear, Undergarments (Comment)  Were All Patient Medications Collected?: Not Applicable  Other Valuables: Money (Comment), Wallet, Other (Comment), Cell phone, Kasey Tadeo sent home with patient. Valuables retrieved from safe, Security envelope number:  [id number] and returned to patient. Patient left department with staff via ambulatory. Discharged to private residence. \"An Important Message from [employer] About Your Rights\" form photocopy original from admission and provided to pt at discharge N/A. Patient education on aftercare instructions: YES  Bridge Appointment completed: Reviewed Discharge Instructions with patient. Patient verbalizes understanding and agreement with the discharge plan using the teachback method. Information faxed to [facility] by [facility] Rep Patient verbalize understanding of AVS:  YES.     Status EXAM upon discharge:  Status and Exam  Normal: Yes  Facial Expression: Brightened  Affect: Stable  Level of Consciousness: Alert  Mood:Normal: Yes  Mood: Other (Comment)(euthymic)  Motor Activity:Normal: Yes  Motor Activity: Increased  Interview Behavior: Cooperative  Preception: [place] to Person, [place] to Time, [place] to Place, [place] to Situation  Attention:Normal: Yes  Attention: Distractible  Thought Processes: (logical)  Thought Content:Normal: Yes  Thought Content: Paranoia  Hallucinations: None  Delusions: No  Delusions: (not note tihis shift)  Memory:Normal: Yes  Insight and Judgment: No  Insight and Judgment: Poor Insight  Present Suicidal Ideation: No  Present Homicidal Ideation: No    Cassidy Juarez RN

## 2020-08-04 NOTE — PLAN OF CARE
Problem: KNOWLEDGE DEFICIT,EDUCATION,DISCHARGE PLAN  Goal: Knowledge - personal safety  8/4/2020 0754 by Hansa Todd RN  Outcome: Completed  Note: 1. Warning signs that happen when I am distressed or experience harmful thoughts - increased heary rate, running thoughts  2. Activities that I can do to cope in a healthy way when I am stressed or distressed - walk sleep  3. List of roadblocks that keep me from using healthy coping skills - loneliness, low self esteem  4.  List of my support persons - Mark Last  Dad Valeria Kohlering   Sister Cindy Rangel    EMERGENCY CONTACTS:  -National suicide prevention life line 3-374-958-152-781-9637  -24 hour crisis line 4-468-HOPE (7700)  -Domestic violence hotline 0-240-479-SAFE (1978)  -Test CONNECT to 721264 to chat with a trained crisis counselor 24 hours/day 7 days a week  -CALL [phone]

## 2020-08-05 ENCOUNTER — TELEPHONE (OUTPATIENT)
Dept: PSYCHIATRY | Age: 30
End: 2020-08-05